# Patient Record
Sex: MALE | Race: WHITE | NOT HISPANIC OR LATINO | Employment: FULL TIME | URBAN - NONMETROPOLITAN AREA
[De-identification: names, ages, dates, MRNs, and addresses within clinical notes are randomized per-mention and may not be internally consistent; named-entity substitution may affect disease eponyms.]

---

## 2017-03-19 ENCOUNTER — APPOINTMENT (OUTPATIENT)
Dept: GENERAL RADIOLOGY | Facility: HOSPITAL | Age: 72
End: 2017-03-19

## 2017-03-19 ENCOUNTER — HOSPITAL ENCOUNTER (EMERGENCY)
Facility: HOSPITAL | Age: 72
Discharge: HOME | End: 2017-03-19
Attending: EMERGENCY MEDICINE | Admitting: EMERGENCY MEDICINE

## 2017-03-19 VITALS
OXYGEN SATURATION: 99 % | HEART RATE: 87 BPM | TEMPERATURE: 98.2 F | DIASTOLIC BLOOD PRESSURE: 87 MMHG | BODY MASS INDEX: 25.11 KG/M2 | HEIGHT: 67 IN | RESPIRATION RATE: 18 BRPM | WEIGHT: 160 LBS | SYSTOLIC BLOOD PRESSURE: 122 MMHG

## 2017-03-19 DIAGNOSIS — R07.81 CHEST PAIN, PLEURITIC: Primary | ICD-10-CM

## 2017-03-19 LAB
ALBUMIN SERPL-MCNC: 4.1 G/DL (ref 3.4–4.8)
ALBUMIN/GLOB SERPL: 1.2 G/DL (ref 1.5–2.5)
ALP SERPL-CCNC: 86 U/L (ref 40–129)
ALT SERPL W P-5'-P-CCNC: 14 U/L (ref 10–44)
ANION GAP SERPL CALCULATED.3IONS-SCNC: 6.9 MMOL/L (ref 3.6–11.2)
AST SERPL-CCNC: 19 U/L (ref 10–34)
BASOPHILS # BLD AUTO: 0.03 10*3/MM3 (ref 0–0.3)
BASOPHILS NFR BLD AUTO: 0.3 % (ref 0–2)
BILIRUB SERPL-MCNC: 0.4 MG/DL (ref 0.2–1.8)
BUN BLD-MCNC: 17 MG/DL (ref 7–21)
BUN/CREAT SERPL: 14.7 (ref 7–25)
CALCIUM SPEC-SCNC: 9.6 MG/DL (ref 7.7–10)
CHLORIDE SERPL-SCNC: 107 MMOL/L (ref 99–112)
CK MB SERPL-CCNC: 1.46 NG/ML (ref 0–5)
CK MB SERPL-CCNC: 1.9 NG/ML (ref 0–5)
CK MB SERPL-RTO: 1.9 % (ref 0–3)
CK SERPL-CCNC: 78 U/L (ref 24–204)
CO2 SERPL-SCNC: 28.1 MMOL/L (ref 24.3–31.9)
CREAT BLD-MCNC: 1.16 MG/DL (ref 0.43–1.29)
DEPRECATED RDW RBC AUTO: 52.5 FL (ref 37–54)
EOSINOPHIL # BLD AUTO: 0.06 10*3/MM3 (ref 0–0.7)
EOSINOPHIL NFR BLD AUTO: 0.5 % (ref 0–7)
ERYTHROCYTE [DISTWIDTH] IN BLOOD BY AUTOMATED COUNT: 16.2 % (ref 11.5–14.5)
GFR SERPL CREATININE-BSD FRML MDRD: 62 ML/MIN/1.73
GLOBULIN UR ELPH-MCNC: 3.5 GM/DL
GLUCOSE BLD-MCNC: 130 MG/DL (ref 70–110)
HCT VFR BLD AUTO: 39.7 % (ref 42–52)
HGB BLD-MCNC: 12.4 G/DL (ref 14–18)
HOLD SPECIMEN: NORMAL
HOLD SPECIMEN: NORMAL
IMM GRANULOCYTES # BLD: 0.01 10*3/MM3 (ref 0–0.03)
IMM GRANULOCYTES NFR BLD: 0.1 % (ref 0–0.5)
LYMPHOCYTES # BLD AUTO: 1.02 10*3/MM3 (ref 1–3)
LYMPHOCYTES NFR BLD AUTO: 9.2 % (ref 16–46)
MCH RBC QN AUTO: 28.1 PG (ref 27–33)
MCHC RBC AUTO-ENTMCNC: 31.2 G/DL (ref 33–37)
MCV RBC AUTO: 90 FL (ref 80–94)
MONOCYTES # BLD AUTO: 1.29 10*3/MM3 (ref 0.1–0.9)
MONOCYTES NFR BLD AUTO: 11.6 % (ref 0–12)
NEUTROPHILS # BLD AUTO: 8.73 10*3/MM3 (ref 1.4–6.5)
NEUTROPHILS NFR BLD AUTO: 78.3 % (ref 40–75)
OSMOLALITY SERPL CALC.SUM OF ELEC: 286.4 MOSM/KG (ref 273–305)
PLATELET # BLD AUTO: 372 10*3/MM3 (ref 130–400)
PMV BLD AUTO: 9.9 FL (ref 6–10)
POTASSIUM BLD-SCNC: 3.8 MMOL/L (ref 3.5–5.3)
PROT SERPL-MCNC: 7.6 G/DL (ref 6–8)
RBC # BLD AUTO: 4.41 10*6/MM3 (ref 4.7–6.1)
SODIUM BLD-SCNC: 142 MMOL/L (ref 135–153)
TROPONIN I SERPL-MCNC: <0.006 NG/ML
TROPONIN I SERPL-MCNC: <0.006 NG/ML
WBC NRBC COR # BLD: 11.14 10*3/MM3 (ref 4.5–12.5)
WHOLE BLOOD HOLD SPECIMEN: NORMAL
WHOLE BLOOD HOLD SPECIMEN: NORMAL

## 2017-03-19 PROCEDURE — 93010 ELECTROCARDIOGRAM REPORT: CPT | Performed by: INTERNAL MEDICINE

## 2017-03-19 PROCEDURE — 82550 ASSAY OF CK (CPK): CPT | Performed by: PHYSICIAN ASSISTANT

## 2017-03-19 PROCEDURE — 93005 ELECTROCARDIOGRAM TRACING: CPT

## 2017-03-19 PROCEDURE — 82553 CREATINE MB FRACTION: CPT | Performed by: PHYSICIAN ASSISTANT

## 2017-03-19 PROCEDURE — 85025 COMPLETE CBC W/AUTO DIFF WBC: CPT | Performed by: EMERGENCY MEDICINE

## 2017-03-19 PROCEDURE — 25010000002 ONDANSETRON PER 1 MG: Performed by: PHYSICIAN ASSISTANT

## 2017-03-19 PROCEDURE — 80053 COMPREHEN METABOLIC PANEL: CPT | Performed by: EMERGENCY MEDICINE

## 2017-03-19 PROCEDURE — 82553 CREATINE MB FRACTION: CPT | Performed by: EMERGENCY MEDICINE

## 2017-03-19 PROCEDURE — 96375 TX/PRO/DX INJ NEW DRUG ADDON: CPT

## 2017-03-19 PROCEDURE — 71010 XR CHEST 1 VW: CPT | Performed by: RADIOLOGY

## 2017-03-19 PROCEDURE — 71010 HC CHEST PA OR AP: CPT

## 2017-03-19 PROCEDURE — 25010000002 HYDROMORPHONE PER 4 MG: Performed by: EMERGENCY MEDICINE

## 2017-03-19 PROCEDURE — 84484 ASSAY OF TROPONIN QUANT: CPT | Performed by: EMERGENCY MEDICINE

## 2017-03-19 PROCEDURE — 99284 EMERGENCY DEPT VISIT MOD MDM: CPT

## 2017-03-19 PROCEDURE — 96374 THER/PROPH/DIAG INJ IV PUSH: CPT

## 2017-03-19 RX ORDER — ACETAMINOPHEN AND CODEINE PHOSPHATE 300; 30 MG/1; MG/1
1 TABLET ORAL EVERY 6 HOURS PRN
Qty: 12 TABLET | Refills: 0 | Status: SHIPPED | OUTPATIENT
Start: 2017-03-19

## 2017-03-19 RX ORDER — ASPIRIN 81 MG/1
81 TABLET ORAL DAILY
COMMUNITY

## 2017-03-19 RX ORDER — ONDANSETRON 2 MG/ML
4 INJECTION INTRAMUSCULAR; INTRAVENOUS ONCE
Status: COMPLETED | OUTPATIENT
Start: 2017-03-19 | End: 2017-03-19

## 2017-03-19 RX ORDER — ASPIRIN 81 MG/1
TABLET, CHEWABLE ORAL
Status: COMPLETED
Start: 2017-03-19 | End: 2017-03-19

## 2017-03-19 RX ORDER — ASPIRIN 325 MG
325 TABLET ORAL ONCE
Status: DISCONTINUED | OUTPATIENT
Start: 2017-03-19 | End: 2017-03-19

## 2017-03-19 RX ORDER — ASPIRIN 81 MG/1
324 TABLET, CHEWABLE ORAL ONCE
Status: COMPLETED | OUTPATIENT
Start: 2017-03-19 | End: 2017-03-19

## 2017-03-19 RX ORDER — SODIUM CHLORIDE 0.9 % (FLUSH) 0.9 %
10 SYRINGE (ML) INJECTION AS NEEDED
Status: DISCONTINUED | OUTPATIENT
Start: 2017-03-19 | End: 2017-03-19 | Stop reason: HOSPADM

## 2017-03-19 RX ADMIN — ASPIRIN 243 MG: 81 TABLET, CHEWABLE ORAL at 11:48

## 2017-03-19 RX ADMIN — ONDANSETRON 4 MG: 2 INJECTION INTRAMUSCULAR; INTRAVENOUS at 13:53

## 2017-03-19 RX ADMIN — HYDROMORPHONE HYDROCHLORIDE 1 MG: 1 INJECTION, SOLUTION INTRAMUSCULAR; INTRAVENOUS; SUBCUTANEOUS at 13:55

## 2017-03-19 NOTE — ED PROVIDER NOTES
Subjective   HPI Comments: Pt states that he had URI last week and feels like his ribs/chest wall is sore   Also does a lot of heavy lifting at work pushing/puling   C/o of headache as well     Patient is a 71 y.o. male presenting with chest pain.   History provided by:  Patient   used: No    Chest Pain   Pain location:  L chest, R chest and substernal area  Pain quality: sharp    Pain radiates to:  Does not radiate  Pain severity:  Mild  Onset quality:  Gradual  Duration:  1 week  Timing:  Intermittent  Progression:  Unchanged  Chronicity:  New  Context: breathing, lifting and movement    Context: not at rest    Relieved by:  None tried  Worsened by:  Coughing, exertion and movement  Ineffective treatments:  None tried  Associated symptoms: no cough, no fever, no headache, no nausea, no shortness of breath and no vomiting    Risk factors: male sex and smoking        Review of Systems   Constitutional: Negative for chills and fever.   HENT: Negative for congestion, ear pain and sore throat.    Respiratory: Negative for cough, shortness of breath and wheezing.    Cardiovascular: Positive for chest pain.   Gastrointestinal: Negative for diarrhea, nausea and vomiting.   Genitourinary: Negative for dysuria and flank pain.   Skin: Negative for rash.   Neurological: Negative for headaches.   Psychiatric/Behavioral: The patient is not nervous/anxious.    All other systems reviewed and are negative.      History reviewed. No pertinent past medical history.    Allergies   Allergen Reactions   • Nitrofuran Derivatives      Pt reports syncope     • Nitroglycerin    • Sulfa Antibiotics        History reviewed. No pertinent past surgical history.    History reviewed. No pertinent family history.    Social History     Social History   • Marital status:      Spouse name: N/A   • Number of children: N/A   • Years of education: N/A     Social History Main Topics   • Smoking status: Passive Smoke Exposure -  Never Smoker   • Smokeless tobacco: None   • Alcohol use No   • Drug use: No   • Sexual activity: Not Asked     Other Topics Concern   • None     Social History Narrative   • None           Objective   Physical Exam   Constitutional: He is oriented to person, place, and time. He appears well-developed and well-nourished.   HENT:   Head: Normocephalic.   Mouth/Throat: Oropharynx is clear and moist.   Neck: Neck supple.   Cardiovascular: Normal rate and regular rhythm.    Pulmonary/Chest: Effort normal and breath sounds normal. He exhibits tenderness. He exhibits no deformity and no swelling.   Abdominal: Soft. Bowel sounds are normal. There is no tenderness.   Musculoskeletal: Normal range of motion.   Neurological: He is alert and oriented to person, place, and time.   Skin: Skin is warm and dry.   Psychiatric: He has a normal mood and affect. His behavior is normal. Judgment and thought content normal.   Nursing note and vitals reviewed.      Procedures         ED Course  ED Course   Comment By Time   Pt was offered admission states that he thinks he has strained his chest wall at work or from his URI last week and wants to f/u with his family doctor in am- I recommended to him that he needed a stress test for further eval  RYANNE Barakat 03/19 1558   Ekg no acute changes per RYANNE Perla 03/19 1600                  MDM    Final diagnoses:   Chest pain, pleuritic            RYANNE Barakat  03/20/17 0664

## 2017-03-19 NOTE — ED NOTES
Ekg performed by tech shown to Dr. Wyman to review at 1114.      Chiquita Reyes  03/19/17 1118       Chiquita Reyes  03/19/17 1113

## 2017-03-19 NOTE — ED NOTES
pts family report pt has also been complaining of headaches and forgot to tell staff.     Nat López RN  03/19/17 7742

## 2021-08-12 ENCOUNTER — HOSPITAL ENCOUNTER (OUTPATIENT)
Dept: HOSPITAL 79 - OR | Age: 76
Discharge: HOME | End: 2021-08-12
Attending: UROLOGY
Payer: COMMERCIAL

## 2021-08-12 DIAGNOSIS — E78.5: ICD-10-CM

## 2021-08-12 DIAGNOSIS — Z79.899: ICD-10-CM

## 2021-08-12 DIAGNOSIS — Z87.891: ICD-10-CM

## 2021-08-12 DIAGNOSIS — Z20.822: ICD-10-CM

## 2021-08-12 DIAGNOSIS — E03.9: ICD-10-CM

## 2021-08-12 DIAGNOSIS — R97.20: Primary | ICD-10-CM

## 2021-08-12 DIAGNOSIS — Z85.46: ICD-10-CM

## 2021-08-12 DIAGNOSIS — I10: ICD-10-CM

## 2021-09-04 ENCOUNTER — HOSPITAL ENCOUNTER (OUTPATIENT)
Dept: HOSPITAL 79 - LAB | Age: 76
End: 2021-09-04
Attending: INTERNAL MEDICINE
Payer: COMMERCIAL

## 2021-09-04 DIAGNOSIS — I10: Primary | ICD-10-CM

## 2021-09-04 LAB
BUN/CREATININE RATIO: 13 (ref 0–10)
HGB BLD-MCNC: 14.5 GM/DL (ref 14–17.5)
RED BLOOD COUNT: 4.8 M/UL (ref 4.2–5.5)
WHITE BLOOD COUNT: 5.1 K/UL (ref 4.5–11)

## 2022-08-13 ENCOUNTER — HOSPITAL ENCOUNTER (OUTPATIENT)
Dept: HOSPITAL 79 - LAB | Age: 77
End: 2022-08-13
Attending: INTERNAL MEDICINE
Payer: COMMERCIAL

## 2022-08-13 DIAGNOSIS — I10: Primary | ICD-10-CM

## 2022-08-13 LAB
BUN/CREATININE RATIO: 13 (ref 0–10)
HGB BLD-MCNC: 14.6 GM/DL (ref 14–17.5)
RED BLOOD COUNT: 4.62 M/UL (ref 4.2–5.5)
WHITE BLOOD COUNT: 7.2 K/UL (ref 4.5–11)

## 2025-05-16 ENCOUNTER — TRANSCRIBE ORDERS (OUTPATIENT)
Dept: ADMINISTRATIVE | Facility: HOSPITAL | Age: 80
End: 2025-05-16
Payer: MEDICARE

## 2025-05-16 DIAGNOSIS — C61 MALIGNANT NEOPLASM OF PROSTATE: Primary | ICD-10-CM

## 2025-05-29 ENCOUNTER — HOSPITAL ENCOUNTER (OUTPATIENT)
Dept: PET IMAGING | Facility: HOSPITAL | Age: 80
Discharge: HOME | End: 2025-05-29
Payer: COMMERCIAL

## 2025-05-29 DIAGNOSIS — C61 MALIGNANT NEOPLASM OF PROSTATE: ICD-10-CM

## 2025-05-29 PROCEDURE — 34310000005 FLUDEOXYGLUCOSE F18 SOLUTION: Performed by: INTERNAL MEDICINE

## 2025-05-29 PROCEDURE — 78815 PET IMAGE W/CT SKULL-THIGH: CPT

## 2025-05-29 PROCEDURE — A9552 F18 FDG: HCPCS | Performed by: INTERNAL MEDICINE

## 2025-05-29 RX ADMIN — FLUDEOXYGLUCOSE F 18 1 DOSE: 200 INJECTION, SOLUTION INTRAVENOUS at 14:36
